# Patient Record
Sex: MALE | Race: WHITE | NOT HISPANIC OR LATINO | Employment: FULL TIME | URBAN - METROPOLITAN AREA
[De-identification: names, ages, dates, MRNs, and addresses within clinical notes are randomized per-mention and may not be internally consistent; named-entity substitution may affect disease eponyms.]

---

## 2023-12-26 ENCOUNTER — OFFICE VISIT (OUTPATIENT)
Dept: PRIMARY CARE | Facility: CLINIC | Age: 53
End: 2023-12-26
Payer: COMMERCIAL

## 2023-12-26 ENCOUNTER — HOSPITAL ENCOUNTER (OUTPATIENT)
Dept: CARDIOLOGY | Facility: CLINIC | Age: 53
Discharge: HOME | End: 2023-12-26
Payer: COMMERCIAL

## 2023-12-26 VITALS
HEIGHT: 74 IN | OXYGEN SATURATION: 97 % | HEART RATE: 89 BPM | SYSTOLIC BLOOD PRESSURE: 140 MMHG | TEMPERATURE: 97.6 F | DIASTOLIC BLOOD PRESSURE: 82 MMHG | WEIGHT: 223 LBS | BODY MASS INDEX: 28.62 KG/M2

## 2023-12-26 DIAGNOSIS — M79.661 RIGHT CALF PAIN: Primary | ICD-10-CM

## 2023-12-26 DIAGNOSIS — M79.661 RIGHT CALF PAIN: ICD-10-CM

## 2023-12-26 DIAGNOSIS — F17.200 SMOKING: ICD-10-CM

## 2023-12-26 DIAGNOSIS — I83.813 VARICOSE VEINS OF BOTH LOWER EXTREMITIES WITH PAIN: ICD-10-CM

## 2023-12-26 DIAGNOSIS — E66.3 OVERWEIGHT (BMI 25.0-29.9): ICD-10-CM

## 2023-12-26 PROCEDURE — 99204 OFFICE O/P NEW MOD 45 MIN: CPT | Performed by: FAMILY MEDICINE

## 2023-12-26 PROCEDURE — 93971 EXTREMITY STUDY: CPT | Performed by: INTERNAL MEDICINE

## 2023-12-26 PROCEDURE — 93971 EXTREMITY STUDY: CPT

## 2023-12-26 RX ORDER — PREDNISONE 50 MG/1
50 TABLET ORAL DAILY
Qty: 3 TABLET | Refills: 0 | Status: SHIPPED | OUTPATIENT
Start: 2023-12-26 | End: 2023-12-29

## 2023-12-26 RX ORDER — DOXYCYCLINE 100 MG/1
100 CAPSULE ORAL 2 TIMES DAILY
Qty: 28 CAPSULE | Refills: 0 | Status: SHIPPED | OUTPATIENT
Start: 2023-12-26 | End: 2024-01-09

## 2023-12-26 ASSESSMENT — PATIENT HEALTH QUESTIONNAIRE - PHQ9
1. LITTLE INTEREST OR PLEASURE IN DOING THINGS: NOT AT ALL
SUM OF ALL RESPONSES TO PHQ9 QUESTIONS 1 AND 2: 0
2. FEELING DOWN, DEPRESSED OR HOPELESS: NOT AT ALL

## 2023-12-26 ASSESSMENT — PAIN SCALES - GENERAL: PAINLEVEL: 8

## 2023-12-26 NOTE — PROGRESS NOTES
NPV   Establish care   Right leg issues - x 2 weeks   Hx of DVT   Hasn't seen a doc in about 20 years

## 2023-12-26 NOTE — PROGRESS NOTES
Patient is here after not having seen a doctor in over 20 years and has had a varicose vein issues for a long period of time with a history of DVT.  He has a red warm patch that popped out after he bumped his leg about 2 weeks ago has been painful.  No shortness of breath some slight wheezing as the patient is currently smoking though he goes off and on with smoking over the last 20 years.    REVIEW OF SYSTEMS: 12 systems negative except for those mentioned in the HPI.    PHYSICAL EXAMINATION:   Constitutional: The patient is alert, in no acute  distress.  Eyes: Extraocular movements are intact.   ENT: external ear canals patent  Neck: no  JVD.  Heart: Regular rate and rhythm without murmur click gallop or rub  Lungs: There is some expiratory wheezing in the left lung base otherwise clear   psychiatric: Good judgment and insight. Normal affect and mood.  Skin: Multiple extremely large varicosities of both legs with distal lateral large patch of warmth with ropey structures underneath and erythema about 4-1/2 inches x 3 inches without ulceration    Assessment:  per EMR    Plan:  Patient has stat duplex.  I discussed may need aspirin versus blood thinner.  Also patient would like to go to the vein clinic in [place].  I recommended her own vein specialist but he declines.  Will also been put on prednisone and doxycycline for secondary infection and inflammation and will be contacting the patient with the stat duplex as it will be done here in this office at 1230.  Patient also would benefit from annual blood work as well but will wait and see.  Also discussed the patient is smoking and is simply going to quit cold turkey like he has in the past.  Pending any substantial DVT may need CT with runoff or trip to the ER    This dictation was created using Dragon dictation and may contain errors

## 2023-12-27 DIAGNOSIS — I80.9 THROMBOPHLEBITIS: Primary | ICD-10-CM

## 2024-02-27 ENCOUNTER — TELEPHONE (OUTPATIENT)
Dept: PRIMARY CARE | Facility: CLINIC | Age: 54
End: 2024-02-27
Payer: COMMERCIAL